# Patient Record
Sex: FEMALE | Race: WHITE
[De-identification: names, ages, dates, MRNs, and addresses within clinical notes are randomized per-mention and may not be internally consistent; named-entity substitution may affect disease eponyms.]

---

## 2021-09-23 ENCOUNTER — HOSPITAL ENCOUNTER (EMERGENCY)
Dept: HOSPITAL 4 - SED | Age: 41
Discharge: HOME | End: 2021-09-23
Payer: COMMERCIAL

## 2021-09-23 VITALS — HEIGHT: 59 IN | BODY MASS INDEX: 37.9 KG/M2 | WEIGHT: 188 LBS

## 2021-09-23 VITALS — SYSTOLIC BLOOD PRESSURE: 146 MMHG

## 2021-09-23 DIAGNOSIS — Z88.0: ICD-10-CM

## 2021-09-23 DIAGNOSIS — S80.211A: Primary | ICD-10-CM

## 2021-09-23 DIAGNOSIS — S80.212A: ICD-10-CM

## 2021-09-23 DIAGNOSIS — Z79.899: ICD-10-CM
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
## 2021-09-23 DIAGNOSIS — S09.90XA: ICD-10-CM
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
## 2021-09-23 NOTE — NUR
Patient given written and verbal discharge instructions and verbalizes 
understanding.  ER MD discussed with patient the results and treatment 
provided. Patient in stable condition. ID arm band removed. 

Rx of Tylenol given. Patient educated on pain management and to follow up with 
PMD. Pain Scale 3.

Opportunity for questions provided and answered. Medication side effect fact 
sheet provided.

## 2021-09-23 NOTE — NUR
Pt walked in to ER with s/p mechanical trip and fall, reports hitting her chin 
and bilat knees, pain 4/10. Pt able to bear weight. No open wounds noted. V/S 
stable, no acute distress noted.